# Patient Record
Sex: MALE | Race: WHITE | HISPANIC OR LATINO | Employment: UNEMPLOYED | URBAN - METROPOLITAN AREA
[De-identification: names, ages, dates, MRNs, and addresses within clinical notes are randomized per-mention and may not be internally consistent; named-entity substitution may affect disease eponyms.]

---

## 2023-08-10 ENCOUNTER — HOSPITAL ENCOUNTER (INPATIENT)
Facility: MEDICAL CENTER | Age: 38
LOS: 1 days | DRG: 917 | End: 2023-08-11
Attending: STUDENT IN AN ORGANIZED HEALTH CARE EDUCATION/TRAINING PROGRAM | Admitting: HOSPITALIST

## 2023-08-10 ENCOUNTER — APPOINTMENT (OUTPATIENT)
Dept: RADIOLOGY | Facility: MEDICAL CENTER | Age: 38
DRG: 917 | End: 2023-08-10
Attending: EMERGENCY MEDICINE

## 2023-08-10 ENCOUNTER — APPOINTMENT (OUTPATIENT)
Dept: RADIOLOGY | Facility: MEDICAL CENTER | Age: 38
DRG: 917 | End: 2023-08-10
Attending: STUDENT IN AN ORGANIZED HEALTH CARE EDUCATION/TRAINING PROGRAM

## 2023-08-10 DIAGNOSIS — S09.90XA CLOSED HEAD INJURY, INITIAL ENCOUNTER: ICD-10-CM

## 2023-08-10 DIAGNOSIS — R09.02 HYPOXIA: ICD-10-CM

## 2023-08-10 DIAGNOSIS — S06.0XAA CONCUSSION WITH UNKNOWN LOSS OF CONSCIOUSNESS STATUS, INITIAL ENCOUNTER: ICD-10-CM

## 2023-08-10 DIAGNOSIS — T40.601A OPIATE OR RELATED NARCOTIC OVERDOSE, ACCIDENTAL OR UNINTENTIONAL, INITIAL ENCOUNTER (HCC): ICD-10-CM

## 2023-08-10 PROBLEM — E86.0 DEHYDRATION: Status: ACTIVE | Noted: 2023-08-10

## 2023-08-10 PROBLEM — R73.9 HYPERGLYCEMIA: Status: ACTIVE | Noted: 2023-08-10

## 2023-08-10 PROBLEM — R74.8 ELEVATED LIVER ENZYMES: Status: ACTIVE | Noted: 2023-08-10

## 2023-08-10 PROBLEM — J96.01 ACUTE HYPOXEMIC RESPIRATORY FAILURE (HCC): Status: ACTIVE | Noted: 2023-08-10

## 2023-08-10 PROBLEM — T50.901A ACCIDENTAL OVERDOSE: Status: ACTIVE | Noted: 2023-08-10

## 2023-08-10 PROBLEM — G93.40 ENCEPHALOPATHY ACUTE: Status: ACTIVE | Noted: 2023-08-10

## 2023-08-10 LAB
ABO + RH BLD: NORMAL
ABO GROUP BLD: NORMAL
ALBUMIN SERPL BCP-MCNC: 3.9 G/DL (ref 3.2–4.9)
ALBUMIN/GLOB SERPL: 1.3 G/DL
ALP SERPL-CCNC: 111 U/L (ref 30–99)
ALT SERPL-CCNC: 256 U/L (ref 2–50)
AMPHET UR QL SCN: POSITIVE
ANION GAP SERPL CALC-SCNC: 13 MMOL/L (ref 7–16)
APTT PPP: 26.4 SEC (ref 24.7–36)
AST SERPL-CCNC: 216 U/L (ref 12–45)
BARBITURATES UR QL SCN: NEGATIVE
BENZODIAZ UR QL SCN: NEGATIVE
BILIRUB SERPL-MCNC: 0.2 MG/DL (ref 0.1–1.5)
BLD GP AB SCN SERPL QL: NORMAL
BUN SERPL-MCNC: 10 MG/DL (ref 8–22)
BZE UR QL SCN: POSITIVE
CALCIUM ALBUM COR SERPL-MCNC: 8.5 MG/DL (ref 8.5–10.5)
CALCIUM SERPL-MCNC: 8.4 MG/DL (ref 8.5–10.5)
CANNABINOIDS UR QL SCN: POSITIVE
CHLORIDE SERPL-SCNC: 104 MMOL/L (ref 96–112)
CO2 SERPL-SCNC: 22 MMOL/L (ref 20–33)
CREAT SERPL-MCNC: 1 MG/DL (ref 0.5–1.4)
ERYTHROCYTE [DISTWIDTH] IN BLOOD BY AUTOMATED COUNT: 45.8 FL (ref 35.9–50)
EST. AVERAGE GLUCOSE BLD GHB EST-MCNC: 114 MG/DL
ETHANOL BLD-MCNC: <10.1 MG/DL
FENTANYL UR QL: POSITIVE
GFR SERPLBLD CREATININE-BSD FMLA CKD-EPI: 100 ML/MIN/1.73 M 2
GLOBULIN SER CALC-MCNC: 3 G/DL (ref 1.9–3.5)
GLUCOSE BLD STRIP.AUTO-MCNC: 101 MG/DL (ref 65–99)
GLUCOSE BLD STRIP.AUTO-MCNC: 107 MG/DL (ref 65–99)
GLUCOSE BLD STRIP.AUTO-MCNC: 87 MG/DL (ref 65–99)
GLUCOSE SERPL-MCNC: 254 MG/DL (ref 65–99)
HAV IGM SERPL QL IA: NORMAL
HBA1C MFR BLD: 5.6 % (ref 4–5.6)
HBV CORE IGM SER QL: NORMAL
HBV SURFACE AG SER QL: NORMAL
HCT VFR BLD AUTO: 47 % (ref 42–52)
HCV AB SER QL: NORMAL
HGB BLD-MCNC: 15.1 G/DL (ref 14–18)
INR PPP: 1.01 (ref 0.87–1.13)
MCH RBC QN AUTO: 29.8 PG (ref 27–33)
MCHC RBC AUTO-ENTMCNC: 32.1 G/DL (ref 32.3–36.5)
MCV RBC AUTO: 92.7 FL (ref 81.4–97.8)
METHADONE UR QL SCN: NEGATIVE
OPIATES UR QL SCN: NEGATIVE
OXYCODONE UR QL SCN: NEGATIVE
PCP UR QL SCN: NEGATIVE
PLATELET # BLD AUTO: 258 K/UL (ref 164–446)
PMV BLD AUTO: 10.6 FL (ref 9–12.9)
POTASSIUM SERPL-SCNC: 3.9 MMOL/L (ref 3.6–5.5)
PROPOXYPH UR QL SCN: NEGATIVE
PROT SERPL-MCNC: 6.9 G/DL (ref 6–8.2)
PROTHROMBIN TIME: 13.2 SEC (ref 12–14.6)
RBC # BLD AUTO: 5.07 M/UL (ref 4.7–6.1)
RH BLD: NORMAL
SODIUM SERPL-SCNC: 139 MMOL/L (ref 135–145)
WBC # BLD AUTO: 8.9 K/UL (ref 4.8–10.8)

## 2023-08-10 PROCEDURE — 83036 HEMOGLOBIN GLYCOSYLATED A1C: CPT

## 2023-08-10 PROCEDURE — 86850 RBC ANTIBODY SCREEN: CPT

## 2023-08-10 PROCEDURE — 82962 GLUCOSE BLOOD TEST: CPT | Mod: 91

## 2023-08-10 PROCEDURE — 99223 1ST HOSP IP/OBS HIGH 75: CPT | Performed by: HOSPITALIST

## 2023-08-10 PROCEDURE — 99285 EMERGENCY DEPT VISIT HI MDM: CPT

## 2023-08-10 PROCEDURE — 86900 BLOOD TYPING SEROLOGIC ABO: CPT

## 2023-08-10 PROCEDURE — 770020 HCHG ROOM/CARE - TELE (206)

## 2023-08-10 PROCEDURE — 80307 DRUG TEST PRSMV CHEM ANLYZR: CPT

## 2023-08-10 PROCEDURE — 700111 HCHG RX REV CODE 636 W/ 250 OVERRIDE (IP): Mod: JZ

## 2023-08-10 PROCEDURE — 700111 HCHG RX REV CODE 636 W/ 250 OVERRIDE (IP): Performed by: EMERGENCY MEDICINE

## 2023-08-10 PROCEDURE — 82077 ASSAY SPEC XCP UR&BREATH IA: CPT

## 2023-08-10 PROCEDURE — 85610 PROTHROMBIN TIME: CPT

## 2023-08-10 PROCEDURE — 85027 COMPLETE CBC AUTOMATED: CPT

## 2023-08-10 PROCEDURE — 305948 HCHG GREEN TRAUMA ACT PRE-NOTIFY NO CC

## 2023-08-10 PROCEDURE — 70450 CT HEAD/BRAIN W/O DYE: CPT

## 2023-08-10 PROCEDURE — 71111 X-RAY EXAM RIBS/CHEST4/> VWS: CPT

## 2023-08-10 PROCEDURE — 36415 COLL VENOUS BLD VENIPUNCTURE: CPT

## 2023-08-10 PROCEDURE — 85730 THROMBOPLASTIN TIME PARTIAL: CPT

## 2023-08-10 PROCEDURE — 96374 THER/PROPH/DIAG INJ IV PUSH: CPT

## 2023-08-10 PROCEDURE — 700105 HCHG RX REV CODE 258: Mod: JZ | Performed by: HOSPITALIST

## 2023-08-10 PROCEDURE — 86901 BLOOD TYPING SEROLOGIC RH(D): CPT

## 2023-08-10 PROCEDURE — 80074 ACUTE HEPATITIS PANEL: CPT

## 2023-08-10 PROCEDURE — 80053 COMPREHEN METABOLIC PANEL: CPT

## 2023-08-10 RX ORDER — AMOXICILLIN 250 MG
2 CAPSULE ORAL 2 TIMES DAILY
Status: DISCONTINUED | OUTPATIENT
Start: 2023-08-10 | End: 2023-08-11 | Stop reason: HOSPADM

## 2023-08-10 RX ORDER — NALOXONE HYDROCHLORIDE 0.4 MG/ML
0.4 INJECTION, SOLUTION INTRAMUSCULAR; INTRAVENOUS; SUBCUTANEOUS ONCE
Status: COMPLETED | OUTPATIENT
Start: 2023-08-10 | End: 2023-08-10

## 2023-08-10 RX ORDER — PROMETHAZINE HYDROCHLORIDE 25 MG/1
12.5-25 TABLET ORAL EVERY 4 HOURS PRN
Status: DISCONTINUED | OUTPATIENT
Start: 2023-08-10 | End: 2023-08-11 | Stop reason: HOSPADM

## 2023-08-10 RX ORDER — NALOXONE HYDROCHLORIDE 1 MG/ML
INJECTION INTRAMUSCULAR; INTRAVENOUS; SUBCUTANEOUS
Status: COMPLETED
Start: 2023-08-10 | End: 2023-08-10

## 2023-08-10 RX ORDER — PROMETHAZINE HYDROCHLORIDE 25 MG/1
12.5-25 SUPPOSITORY RECTAL EVERY 4 HOURS PRN
Status: DISCONTINUED | OUTPATIENT
Start: 2023-08-10 | End: 2023-08-11 | Stop reason: HOSPADM

## 2023-08-10 RX ORDER — SODIUM CHLORIDE, SODIUM LACTATE, POTASSIUM CHLORIDE, CALCIUM CHLORIDE 600; 310; 30; 20 MG/100ML; MG/100ML; MG/100ML; MG/100ML
INJECTION, SOLUTION INTRAVENOUS CONTINUOUS
Status: DISCONTINUED | OUTPATIENT
Start: 2023-08-10 | End: 2023-08-11 | Stop reason: HOSPADM

## 2023-08-10 RX ORDER — BISACODYL 10 MG
10 SUPPOSITORY, RECTAL RECTAL
Status: DISCONTINUED | OUTPATIENT
Start: 2023-08-10 | End: 2023-08-11 | Stop reason: HOSPADM

## 2023-08-10 RX ORDER — ACETAMINOPHEN 325 MG/1
650 TABLET ORAL EVERY 6 HOURS PRN
Status: DISCONTINUED | OUTPATIENT
Start: 2023-08-10 | End: 2023-08-11 | Stop reason: HOSPADM

## 2023-08-10 RX ORDER — NALOXONE HYDROCHLORIDE 0.4 MG/ML
0.4 INJECTION, SOLUTION INTRAMUSCULAR; INTRAVENOUS; SUBCUTANEOUS
Status: DISCONTINUED | OUTPATIENT
Start: 2023-08-10 | End: 2023-08-11 | Stop reason: HOSPADM

## 2023-08-10 RX ORDER — PROCHLORPERAZINE EDISYLATE 5 MG/ML
5-10 INJECTION INTRAMUSCULAR; INTRAVENOUS EVERY 4 HOURS PRN
Status: DISCONTINUED | OUTPATIENT
Start: 2023-08-10 | End: 2023-08-11 | Stop reason: HOSPADM

## 2023-08-10 RX ORDER — POLYETHYLENE GLYCOL 3350 17 G/17G
1 POWDER, FOR SOLUTION ORAL
Status: DISCONTINUED | OUTPATIENT
Start: 2023-08-10 | End: 2023-08-11 | Stop reason: HOSPADM

## 2023-08-10 RX ORDER — ONDANSETRON 2 MG/ML
4 INJECTION INTRAMUSCULAR; INTRAVENOUS EVERY 4 HOURS PRN
Status: DISCONTINUED | OUTPATIENT
Start: 2023-08-10 | End: 2023-08-11 | Stop reason: HOSPADM

## 2023-08-10 RX ORDER — ONDANSETRON 4 MG/1
4 TABLET, ORALLY DISINTEGRATING ORAL EVERY 4 HOURS PRN
Status: DISCONTINUED | OUTPATIENT
Start: 2023-08-10 | End: 2023-08-11 | Stop reason: HOSPADM

## 2023-08-10 RX ORDER — DEXTROSE MONOHYDRATE 25 G/50ML
25 INJECTION, SOLUTION INTRAVENOUS
Status: DISCONTINUED | OUTPATIENT
Start: 2023-08-10 | End: 2023-08-11 | Stop reason: HOSPADM

## 2023-08-10 RX ADMIN — SODIUM CHLORIDE, POTASSIUM CHLORIDE, SODIUM LACTATE AND CALCIUM CHLORIDE: 600; 310; 30; 20 INJECTION, SOLUTION INTRAVENOUS at 22:34

## 2023-08-10 RX ADMIN — NALOXONE HYDROCHLORIDE: 1 INJECTION PARENTERAL at 03:45

## 2023-08-10 RX ADMIN — SODIUM CHLORIDE, POTASSIUM CHLORIDE, SODIUM LACTATE AND CALCIUM CHLORIDE: 600; 310; 30; 20 INJECTION, SOLUTION INTRAVENOUS at 09:44

## 2023-08-10 RX ADMIN — NALOXONE HYDROCHLORIDE 0.4 MG: 0.4 INJECTION, SOLUTION INTRAMUSCULAR; INTRAVENOUS; SUBCUTANEOUS at 07:55

## 2023-08-10 ASSESSMENT — PATIENT HEALTH QUESTIONNAIRE - PHQ9
1. LITTLE INTEREST OR PLEASURE IN DOING THINGS: NOT AT ALL
SUM OF ALL RESPONSES TO PHQ9 QUESTIONS 1 AND 2: 0
2. FEELING DOWN, DEPRESSED, IRRITABLE, OR HOPELESS: NOT AT ALL

## 2023-08-10 ASSESSMENT — LIFESTYLE VARIABLES
TOTAL SCORE: 0
EVER FELT BAD OR GUILTY ABOUT YOUR DRINKING: NO
TOTAL SCORE: 0
HAVE PEOPLE ANNOYED YOU BY CRITICIZING YOUR DRINKING: NO
EVER HAD A DRINK FIRST THING IN THE MORNING TO STEADY YOUR NERVES TO GET RID OF A HANGOVER: NO
HAVE YOU EVER FELT YOU SHOULD CUT DOWN ON YOUR DRINKING: NO
ON A TYPICAL DAY WHEN YOU DRINK ALCOHOL HOW MANY DRINKS DO YOU HAVE: 1
CONSUMPTION TOTAL: NEGATIVE
DOES PATIENT WANT TO STOP DRINKING: NO
HOW MANY TIMES IN THE PAST YEAR HAVE YOU HAD 5 OR MORE DRINKS IN A DAY: 0
TOTAL SCORE: 0
ALCOHOL_USE: YES
AVERAGE NUMBER OF DAYS PER WEEK YOU HAVE A DRINK CONTAINING ALCOHOL: 2

## 2023-08-10 ASSESSMENT — FIBROSIS 4 INDEX: FIB4 SCORE: 1.83

## 2023-08-10 ASSESSMENT — PAIN DESCRIPTION - PAIN TYPE
TYPE: ACUTE PAIN
TYPE: ACUTE PAIN

## 2023-08-10 NOTE — ASSESSMENT & PLAN NOTE
Unclear if this is acute or chronic   No apparent abdominal pain or discomfort/tenderness on examination  I will check acute hepatitis panel    Continue to monitor, avoid/minimize hepatotoxins as much as possible.

## 2023-08-10 NOTE — PROGRESS NOTES
4 Eyes Skin Assessment Completed by NICKI Winter and NICKI Tucker.    Head Swelling and Redness, scab  Ears WDL  Nose WDL  Mouth WDL  Neck WDL  Breast/Chest WDL  Shoulder Blades WDL  Spine WDL  (R) Arm/Elbow/Hand Redness and Scab  (L) Arm/Elbow/Hand WDL  Abdomen WDL  Groin WDL  Scrotum/Coccyx/Buttocks WDL  (R) Leg WDL  (L) Leg WDL  (R) Heel/Foot/Toe WDL  (L) Heel/Foot/Toe WDL          Devices In Places Tele Box, Blood Pressure Cuff, Pulse Ox, and Oxy Mask      Interventions In Place Heels Loaded W/Pillows and Pressure Redistribution Mattress    Possible Skin Injury Yes    Pictures Uploaded Into Epic N/A  Wound Consult Placed N/A  RN Wound Prevention Protocol Ordered No

## 2023-08-10 NOTE — ED NOTES
Pt resting, respirations even and unlabored. Supplemental O2 decreased to 4L, SPO2 above 90%. Call light within reach

## 2023-08-10 NOTE — ED NOTES
Assumed care of pt, bedside report received. Introduced self as pt RN, bed alarm placed, GCS 15, NAD, VSS on 1L oxy mask, aware of POC, denies needs at this time, call light in reach, will continue to monitor.

## 2023-08-10 NOTE — CARE PLAN
The patient is Watcher - Medium risk of patient condition declining or worsening    Shift Goals  Clinical Goals: stable VS  Patient Goals: rest  Family Goals: EVERARDO      Problem: Knowledge Deficit - Standard  Goal: Patient and family/care givers will demonstrate understanding of plan of care, disease process/condition, diagnostic tests and medications  Outcome: Progressing     Problem: Pain - Standard  Goal: Alleviation of pain or a reduction in pain to the patient’s comfort goal  Outcome: Progressing     Problem: Fall Risk  Goal: Patient will remain free from falls  Outcome: Progressing     Problem: Skin Integrity  Goal: Skin integrity is maintained or improved  Outcome: Progressing

## 2023-08-10 NOTE — ED NOTES
Rounded on pt, GCS 15, NAD, VSS on RA, respirations even and unlabored, denies needs at this time. Will continue to monitor.

## 2023-08-10 NOTE — ED NOTES
BIB EMS found unresponsive in a parking lot with civilian CPR in progress. EMS administered 0.5mg Narcan and inserted a NPA, GCS 8. Pt arrives GCS 10, room air saturation 66%. Pt c/o head pain.

## 2023-08-10 NOTE — ED NOTES
Pt o2 sats 75% on RA. Placed on 1L oxy mask, sat up to 85%, bumped up to 4L. Respirations 8, even and shallow. ERP notified. Medicated per MAR.

## 2023-08-10 NOTE — H&P
Hospital Medicine History & Physical Note    Date of Service  8/10/2023    Primary Care Physician  No primary care provider on file.     Consultants  None    Code Status  Full Code    Chief Complaint  Chief Complaint   Patient presents with    Trauma Green     BIB EMS found unresponsive in a parking lot with civilian CPR in progress. EMS administered 0.5mg Narcan and inserted a NPA, GCS 8. Pt arrives GCS 10, room air saturation 66%. Pt c/o head pain.      History of Presenting Illness  Dylan Marinelli Jr. is a 37 y.o. male with a past medical history of drug use who presented 8/10/2023 with altered mental status.  Most of the history was obtained from emergency department physician and patient chart as the patient was encephalopathic during my bedside evaluation.  Patient was found to be hypoxemic saturating 66% on room air.  He received 3 doses of Narcan with some improvement in his mental and respiratory status.      I discussed the plan of care with emergency department physician    Review of Systems  Review of Systems   Unable to perform ROS: Mental status change     Past Medical History   has no past medical history on file.    Surgical History   has no past surgical history on file.     Family History  family history is not on file.      Social History   reports that he has been smoking cigarettes. He has been smoking an average of .5 packs per day. He has never used smokeless tobacco. He reports current alcohol use. He reports current drug use.    Allergies  No Known Allergies    Medications  None     Physical Exam  Temp:  [37.1 °C (98.8 °F)] 37.1 °C (98.8 °F)  Pulse:  [] 96  Resp:  [12-31] 14  BP: (121-170)/() 125/58  SpO2:  [66 %-100 %] 93 %  Blood Pressure: 130/83   Temperature: 37.1 °C (98.8 °F)   Pulse: 87   Respiration: 12   Pulse Oximetry: 97 %     Physical Exam  Constitutional:       General: He is in acute distress.      Appearance: He is ill-appearing. He is not diaphoretic.       Comments: Lethargic secondary, arousable with difficulty   HENT:      Head: Atraumatic.      Right Ear: External ear normal.      Left Ear: External ear normal.      Nose: No congestion or rhinorrhea.      Mouth/Throat:      Mouth: Mucous membranes are dry.   Eyes:      General: No scleral icterus.        Right eye: No discharge.         Left eye: No discharge.      Pupils: Pupils are equal, round, and reactive to light.   Cardiovascular:      Rate and Rhythm: Regular rhythm. Tachycardia present.   Pulmonary:      Comments: Irregular respiratory rate  Requiring 4-6 L of oxygen to achieve adequate saturation  Abdominal:      General: There is no distension.   Musculoskeletal:      Cervical back: Neck supple. No rigidity. No muscular tenderness.      Right lower leg: No edema.      Left lower leg: No edema.   Skin:     General: Skin is dry.      Capillary Refill: Capillary refill takes 2 to 3 seconds.      Coloration: Skin is not jaundiced or pale.   Neurological:      Mental Status: He is disoriented.      Coordination: Coordination normal.      Comments: Lethargic secondary, arousable with difficulty  Intermittently and variably oriented x 1-2   Psychiatric:      Comments: Lethargic secondary, arousable with difficulty       Laboratory:  Recent Labs     08/10/23  0340   WBC 8.9   RBC 5.07   HEMOGLOBIN 15.1   HEMATOCRIT 47.0   MCV 92.7   MCH 29.8   MCHC 32.1*   RDW 45.8   PLATELETCT 258   MPV 10.6     Recent Labs     08/10/23  0340   SODIUM 139   POTASSIUM 3.9   CHLORIDE 104   CO2 22   GLUCOSE 254*   BUN 10   CREATININE 1.00   CALCIUM 8.4*     Recent Labs     08/10/23  0340   ALTSGPT 256*   ASTSGOT 216*   ALKPHOSPHAT 111*   TBILIRUBIN 0.2   GLUCOSE 254*     Recent Labs     08/10/23  0340   APTT 26.4   INR 1.01     No results for input(s): NTPROBNP in the last 72 hours.      No results for input(s): TROPONINT in the last 72 hours.    Imaging:  [id number] (WITH 1-VIEW CXR)   Final Result      No fracture or  acute process seen.      CT-HEAD W/O   Final Result      No acute process.           Assessment/Plan:  Justification for Admission Status  I anticipate this patient will require at least two midnights for appropriate medical management, necessitating inpatient admission because the patient has multiple acute medical problems including acute hypoxemic respiratory failure, acute encephalopathy will require close monitoring, oxygen therapy, breathing treatment, oxygen titration    Patient will need a Telemetry bed on MEDICAL service.  Patient is suspected to have an overdose.    * Encephalopathy acute- (present on admission)  Assessment & Plan  CT scan of the head did not show evidence for acute infarction or hemorrhage  Likely metabolic likely secondary to hypoxia and drug overdose  Anticipate improvement with hypoxia and drug clearance  Consider further diagnostic testing /imaging if encephalopathy does not improve with above measures.     Acute hypoxemic respiratory failure (HCC)- (present on admission)  Assessment & Plan  Likely secondary to drug overdose  Received 3 doses of Narcan so far   I will continue to order as needed Narcan as needed  Oxygen as needed, Respiratory protocol, Bronchodilators, Incentive spirometry  Anticipate improvement with hypoxia and drug clearance     Accidental overdose- (present on admission)  Assessment & Plan  Mr. Marinelli was here with a suspected overdose of Unknown substance or drug; he has no other known overdoses.     Likely narcotics as there is some response to Narcan  Continue to monitor respiratory status, I will order Narcan as needed  Continue to monitor vital signs, support circulation with intravenous fluids    Dehydration- (present on admission)  Assessment & Plan  Likely secondary to reduced oral intake.  Encourage oral intake as tolerated, antiemetics as needed.  Intravenous hydration until adequate oral intake is achieved    Elevated liver enzymes- (present on  admission)  Assessment & Plan  Unclear if this is acute or chronic   No apparent abdominal pain or discomfort/tenderness on examination  I will check acute hepatitis panel    Continue to monitor, avoid/minimize hepatotoxins as much as possible.     Hyperglycemia- (present on admission)  Assessment & Plan  No known history of diabetes  Presents with hyperglycemia  I will check a glycated hemoglobin   I will start short acting insulin for now  I will order Accu-Checks, hypoglycemia protocol  Adjust according to blood sugars trend    VTE prophylaxis: SCDs/TEDs and Xarelto 10 mg daily as prophylaxis

## 2023-08-10 NOTE — ASSESSMENT & PLAN NOTE
Likely secondary to drug overdose  Received 3 doses of Narcan so far   I will continue to order as needed Narcan as needed  Oxygen as needed, Respiratory protocol, Bronchodilators, Incentive spirometry  Anticipate improvement with hypoxia and drug clearance

## 2023-08-10 NOTE — ASSESSMENT & PLAN NOTE
No known history of diabetes  Presents with hyperglycemia  I will check a glycated hemoglobin   I will start short acting insulin for now  I will order Accu-Checks, hypoglycemia protocol  Adjust according to blood sugars trend

## 2023-08-10 NOTE — ED NOTES
Pt trailed off supplemental O2, SPO2 drop to 89%. Pt placed back on supplemental O2, 1L. SPO2 back to 92%.

## 2023-08-10 NOTE — ED TRIAGE NOTES
Chief Complaint   Patient presents with    Trauma Green     BIB EMS found unresponsive in a parking lot with civilian CPR in progress. EMS administered 0.5mg Narcan and inserted a NPA, GCS 8. Pt arrives GCS 10, room air saturation 66%. Pt c/o head pain.       Patient upgraded to Trauma Green from red 6, pt to CT in stable condition.

## 2023-08-10 NOTE — DISCHARGE INSTRUCTIONS
Diet    Diabetic Diet     A Diabetic Diet can help you control your blood glucose, lower your risk of heart disease, improve your blood pressure and maintain a healthy weight.  Eating healthy foods, low in calories, fat and carbohydrates at the same time every day can help control your blood glucose. Carbohydrates can greatly affect your blood glucose levels.  Counting carbs is important to keep your blood glucose at a healthy level, especially if you use insulin or take certain oral diabetes medicines.  Avoid alcohol as it can cause a sudden decrease in blood glucose (hypoglycemia), especially if you use insulin or take certain oral diabetes medicines.    Activity    Resume Your Normal Activity    You may resume your normal activity as tolerated.  Rest as needed.

## 2023-08-10 NOTE — ED PROVIDER NOTES
ED Observation Discharge Summary    Scribed for Caitlin Vaughn M.D. by Sergio Haynes. 8/10/2023  11:17 AM    Patient:Dylan Marinelli Jr.  Patient : 1985  Patient MRN: [id number]  Patient PCP: No primary care provider reported.     Admit Date: 8/10/2023  Discharge Date and Time: 08/10/23 11:17 AM  Discharge Diagnosis:   1. Closed head injury, initial encounter    2. Opiate or related narcotic overdose, accidental or unintentional, initial encounter ([facility])    3. Concussion with unknown loss of consciousness status, initial encounter    4. Hypoxia      Discharge Attending: Caitlin Vaughn M.D.  Discharge Service: ED Observation    ED Course  Dylan is a 37 y.o. male who was evaluated at [facility]. Mr. Marinelli initially presented to this emergency department early this morning with altered mental status, and hypoxia.  Noted that he received IV Narcan with minimal response.  After he was given 2 mg of naloxone hypoxia resolved, he became much more awake and alert, hypoxemia resolved.  He was noted to be hypoxic to 66% on room air.  Noted that he did get CPR in the prehospital setting, has not required cardiopulmonary resuscitation in the emergency department.    On my reassessment, resting pulse oximetry is in the mid 90s, however with movement or speaking he drops to the mid 80s.  He still feels somewhat drowsy, but is awake and alert without any evidence of airway deterioration or mental status deterioration.  We will continue to keep him under close observation given hypoxia with activity.    Shortly after my initial assessment I was notified by RN that his oxygen saturation dropped to 70%, and respiratory rate dropped to 8.  Oxygen saturation improved with increased supplemental oxygen.  He was given another dose of 0.4 mg Narcan with improvement, and now no longer is on supplemental oxygen.    At this time he has been in the emergency department for 5 hours.  He required a dose of Narcan prior to arrival  in the prehospital setting, as well as a large dose of Narcan on arrival at this facility.  Despite multiple doses of Narcan, he continues to have episodes of hypoxia which would clearly progressed to apneic events without rapid intervention.  For this reason plan at this time is for admission to hospitalist service for ongoing monitoring, pulse oximetry monitoring, and repeat doses of Narcan as needed.  He states that he thought he was using cocaine, and did not use any opiate medications.  At this time, suspect the cocaine may have been laced with an opiate such as long-acting fentanyl.    Constitutional: Afebrile  HENT:  Atraumatic, normocephalic  Eyes: Normal conjunctiva, no periorbital edema  Neck: Normal and painless range of motion, supple  Cardiovascular: No tachycardia  Thorax & Lungs: Breath sounds clear and equal bilaterally, when awake and alert oxygen saturation is in the mid to high 90s, though he has had waxing and waning hypoxia as documented above  Abdomen: Nondistended  Skin:  Warm, dry, intact  Extremities: No abnormal swelling nor deformity  Psychiatric: Calm and cooperative    Labs  All labs reviewed.  Urine drug screen positive for Cocaine and Fentanyl    Radiology  [id number] (WITH 1-VIEW CXR)   Final Result      No fracture or acute process seen.      CT-HEAD W/O   Final Result      No acute process.            Medications:   There are no discharge medications for this patient.    My final assessment includes   1. Closed head injury, initial encounter    2. Opiate or related narcotic overdose, accidental or unintentional, initial encounter ([facility])    3. Concussion with unknown loss of consciousness status, initial encounter    4. Hypoxia        Upon Reevaluation, the patient's condition has: not improved; and will be escalated to hospitalization.    Patient discharged from ED Observation status at 8:44 AM, 8/10/2023.     Total time spent on this ED Observation discharge encounter is 45  minutes.     ISergio (Scribe), am scribing for, and in the presence of, Caitlin Vaughn M.D.    Electronically signed by: Sergio Haynes (Scribe), 8/10/2023    Caitlin IRAHETA M.D. personally performed the services described in this documentation, as scribed by Sergio Haynes in my presence, and it is both accurate and complete.     The note accurately reflects work and decisions made by me.  Caitlin Vaughn M.D.  8/13/2023  10:00 AM

## 2023-08-10 NOTE — ED PROVIDER NOTES
"ED Provider Note    CHIEF COMPLAINT  Chief Complaint   Patient presents with    Trauma Green     BIB EMS found unresponsive in a parking lot with civilian CPR in progress. EMS administered 0.5mg Narcan and inserted a NPA, GCS 8. Pt arrives GCS 10, room air saturation 66%. Pt c/o head pain.        HPI/ROS  LIMITATION TO HISTORY   Select: : None  OUTSIDE HISTORIAN(S):  EMS ports the patient received CPR in route and Narcan    Dylan Marinelli Jr. is a 37 y.o. male who presents with trauma to the face and back of the head, altered mental status, hypoxic, 0.5 mg of IV Narcan with minimal response.  Patient does not require pain.  Patient reports he took a pill that he thought was a pain pill.  Patient denies any known trauma or falling out of a moving vehicle.  Patient reports that he has his tetanus up-to-date.    PAST MEDICAL HISTORY       SURGICAL HISTORY  patient denies any surgical history    FAMILY HISTORY  History reviewed. No pertinent family history.    SOCIAL HISTORY  Social History     Tobacco Use    Smoking status: Every Day     Packs/day: 0.50     Types: Cigarettes    Smokeless tobacco: Never   Substance and Sexual Activity    Alcohol use: Yes     Comment: couple times a week    Drug use: Yes     Comment: \"pills\", marijuana    Sexual activity: Not on file       CURRENT MEDICATIONS  Home Medications       Reviewed by Lilia Thayer RTORIBIO (Registered Nurse) on 08/10/23 at 0351  Med List Status: Partial     Medication Last Dose Status        Patient Miguel Taking any Medications                           ALLERGIES  No Known Allergies    PHYSICAL EXAM  VITAL SIGNS: BP (!) 121/94   Pulse 98   Temp 37.1 °C (98.8 °F) (Temporal)   Resp (!) 31   Ht 1.803 m (5' 11\")   Wt 90.7 kg (200 lb)   SpO2 100%   BMI 27.89 kg/m²      Primary Survey:  Airway is intact, breath sounds equal bilaterally, pulses 2+ upper extremity and lower extremity, GCS - 13  Patient fully exposed and gowned in trauma " bay.    Secondary Survey:  Constitutional: Drowsy, open eyes to verbal stimulation, confused  HENT: Abrasions to the mid upper forehead, ecchymosis to the occiput  Eyes: Pupils equal and reactive, normal conjunctiva  Neck: Supple, normal range of motion, no stridor  Cardiovascular: Extremities are warm and well perfused, no murmur appreciated, normal cardiac auscultation  Pulmonary: No respiratory distress, normal work of breathing, no accessory muscule usage, breath sounds clear and equal bilaterally  Abdomen: Soft, non-distended, non-tender to palpation, no peritoneal signs  Skin: Warm, dry, no rashes or lesions  Musculoskeletal: Normal range of motion in all extremities, no swelling or deformity noted, no C/T/L spine midline TTP, step-offs or deformities  Neurologic: Drowsy GCS 13 normal motor function    DIAGNOSTIC STUDIES / PROCEDURES    LABS  No anemia    RADIOLOGY  I have independently interpreted the diagnostic imaging associated with this visit and am waiting the final reading from the radiologist.   My preliminary interpretation is as follows: No intracranial hemorrhage  Radiologist interpretation: No intracranial hemorrhage    COURSE & MEDICAL DECISION MAKING    ED Observation Status? Yes; I am placing the patient in to an observation status due to a diagnostic uncertainty as well as therapeutic intensity. Patient placed in observation status at 4:16 AM, 8/10/2023.     Observation plan is as follows: Pending 6-hour observation post Narcan      INITIAL ASSESSMENT, COURSE AND PLAN  Care Narrative: Patient responded very well to 2 mg of IV naloxone.  Patient hypoxia resolved, patient became alert after significant drowsiness and hypoxemia 66%.  CT head demonstrates no evidence of intracranial abnormality or skull fracture.  No other identified however patient did get CPR, x-ray of the chest to rule out rib or sternal fracture.  Disposition pending observation following Narcan administration.  No spine  injury.  Care of patient handed off to oncoming provider pending observation prior to discharge.    This dictation has been created using voice recognition software. I am continuously working with the software to minimize the number of voice recognition errors and I have made every attempt to manually correct the errors within my dictation. However errors  related to this voice recognition software may still exist and should be interpreted within the appropriate context.     Electronically signed by: Vince Verde M.D., 8/10/2023 4:16 AM    CRITICAL CARE  The very real possibilty of a deterioration of this patient's condition required the highest level of my preparedness for sudden, emergent intervention.  I provided critical care services, which included medication orders, frequent reevaluations of the patient's condition and response to treatment, ordering and reviewing test results, and discussing the case with various consultants.  The critical care time associated with the care of the patient was 35 minutes. Review chart for interventions. This time is exclusive of any other billable procedures.       FINAL DIAGNOSIS  1. Closed head injury, initial encounter    2. Opiate or related narcotic overdose, accidental or unintentional, initial encounter (HCC)           Electronically signed by: Vince Verde M.D., 8/10/2023 4:13 AM

## 2023-08-10 NOTE — ASSESSMENT & PLAN NOTE
Mr. Marinelli was here with a suspected overdose of Unknown substance or drug; he has no other known overdoses.     Likely narcotics as there is some response to Narcan  Continue to monitor respiratory status, I will order Narcan as needed  Continue to monitor vital signs, support circulation with intravenous fluids

## 2023-08-11 VITALS
WEIGHT: 190.04 LBS | SYSTOLIC BLOOD PRESSURE: 117 MMHG | OXYGEN SATURATION: 98 % | DIASTOLIC BLOOD PRESSURE: 79 MMHG | TEMPERATURE: 98.2 F | HEART RATE: 64 BPM | HEIGHT: 71 IN | RESPIRATION RATE: 16 BRPM | BODY MASS INDEX: 26.6 KG/M2

## 2023-08-11 PROBLEM — R73.9 HYPERGLYCEMIA: Status: RESOLVED | Noted: 2023-08-10 | Resolved: 2023-08-11

## 2023-08-11 PROBLEM — R74.8 ELEVATED LIVER ENZYMES: Status: RESOLVED | Noted: 2023-08-10 | Resolved: 2023-08-11

## 2023-08-11 PROBLEM — E86.0 DEHYDRATION: Status: RESOLVED | Noted: 2023-08-10 | Resolved: 2023-08-11

## 2023-08-11 PROBLEM — G93.40 ENCEPHALOPATHY ACUTE: Status: RESOLVED | Noted: 2023-08-10 | Resolved: 2023-08-11

## 2023-08-11 PROBLEM — T50.901A ACCIDENTAL OVERDOSE: Status: RESOLVED | Noted: 2023-08-10 | Resolved: 2023-08-11

## 2023-08-11 LAB
ALBUMIN SERPL BCP-MCNC: 3.8 G/DL (ref 3.2–4.9)
ALBUMIN/GLOB SERPL: 1.6 G/DL
ALP SERPL-CCNC: 83 U/L (ref 30–99)
ALT SERPL-CCNC: 152 U/L (ref 2–50)
ANION GAP SERPL CALC-SCNC: 8 MMOL/L (ref 7–16)
AST SERPL-CCNC: 75 U/L (ref 12–45)
BILIRUB SERPL-MCNC: 0.3 MG/DL (ref 0.1–1.5)
BUN SERPL-MCNC: 8 MG/DL (ref 8–22)
CALCIUM ALBUM COR SERPL-MCNC: 9 MG/DL (ref 8.5–10.5)
CALCIUM SERPL-MCNC: 8.8 MG/DL (ref 8.5–10.5)
CHLORIDE SERPL-SCNC: 100 MMOL/L (ref 96–112)
CO2 SERPL-SCNC: 27 MMOL/L (ref 20–33)
CREAT SERPL-MCNC: 0.77 MG/DL (ref 0.5–1.4)
ERYTHROCYTE [DISTWIDTH] IN BLOOD BY AUTOMATED COUNT: 46.2 FL (ref 35.9–50)
GFR SERPLBLD CREATININE-BSD FMLA CKD-EPI: 119 ML/MIN/1.73 M 2
GLOBULIN SER CALC-MCNC: 2.4 G/DL (ref 1.9–3.5)
GLUCOSE BLD STRIP.AUTO-MCNC: 101 MG/DL (ref 65–99)
GLUCOSE SERPL-MCNC: 111 MG/DL (ref 65–99)
HCT VFR BLD AUTO: 45.2 % (ref 42–52)
HGB BLD-MCNC: 14.3 G/DL (ref 14–18)
MAGNESIUM SERPL-MCNC: 2.1 MG/DL (ref 1.5–2.5)
MCH RBC QN AUTO: 29.3 PG (ref 27–33)
MCHC RBC AUTO-ENTMCNC: 31.6 G/DL (ref 32.3–36.5)
MCV RBC AUTO: 92.6 FL (ref 81.4–97.8)
PLATELET # BLD AUTO: 209 K/UL (ref 164–446)
PMV BLD AUTO: 10.8 FL (ref 9–12.9)
POTASSIUM SERPL-SCNC: 4.1 MMOL/L (ref 3.6–5.5)
PROT SERPL-MCNC: 6.2 G/DL (ref 6–8.2)
RBC # BLD AUTO: 4.88 M/UL (ref 4.7–6.1)
SODIUM SERPL-SCNC: 135 MMOL/L (ref 135–145)
WBC # BLD AUTO: 9.5 K/UL (ref 4.8–10.8)

## 2023-08-11 PROCEDURE — 82962 GLUCOSE BLOOD TEST: CPT

## 2023-08-11 PROCEDURE — 80053 COMPREHEN METABOLIC PANEL: CPT

## 2023-08-11 PROCEDURE — 83735 ASSAY OF MAGNESIUM: CPT

## 2023-08-11 PROCEDURE — 36415 COLL VENOUS BLD VENIPUNCTURE: CPT

## 2023-08-11 PROCEDURE — 99239 HOSP IP/OBS DSCHRG MGMT >30: CPT | Performed by: INTERNAL MEDICINE

## 2023-08-11 PROCEDURE — 85027 COMPLETE CBC AUTOMATED: CPT

## 2023-08-11 RX ORDER — NALOXONE HYDROCHLORIDE 4 MG/.1ML
1 SPRAY NASAL
Qty: 2 EACH | Refills: 0 | Status: SHIPPED | OUTPATIENT
Start: 2023-08-11

## 2023-08-11 ASSESSMENT — COGNITIVE AND FUNCTIONAL STATUS - GENERAL
MOBILITY SCORE: 24
SUGGESTED CMS G CODE MODIFIER MOBILITY: CH
STANDING UP FROM CHAIR USING ARMS: A LITTLE
DAILY ACTIVITIY SCORE: 23
WALKING IN HOSPITAL ROOM: A LITTLE
TOILETING: A LITTLE
DAILY ACTIVITIY SCORE: 24
CLIMB 3 TO 5 STEPS WITH RAILING: A LITTLE
SUGGESTED CMS G CODE MODIFIER DAILY ACTIVITY: CI
SUGGESTED CMS G CODE MODIFIER DAILY ACTIVITY: CH

## 2023-08-11 NOTE — PROGRESS NOTES
Assumed care of patient at bedside report from NOC RN.  Patient currently A & O x 4; on 2 L O2 via face mask; up standby assist; without complaints of acute pain. Call light within reach. Whiteboard updated. Fall precautions in place. Bed locked and in lowest position. All questions answered. No other needs indicated at this time.

## 2023-08-11 NOTE — PROGRESS NOTES
Bedside report received. Patient A/Ox 4. VS -140's . Oxygen requirements of 4L. Telemetry monitoring SR-ST. No complaints of pain currently. POC discussed with patient. Pt verbalizes understanding. Call light and belongings within reach. Bed locked and lowest position, alarm and fall precautions in place.

## 2023-08-11 NOTE — CARE PLAN
The patient is Watcher - Medium risk of patient condition declining or worsening    Shift Goals  Clinical Goals: Monitor RR, wean O2  Patient Goals: Sleep  Family Goals:  (Not present)    Progress made toward(s) clinical / shift goals:    Problem: Pain - Standard  Goal: Alleviation of pain or a reduction in pain to the patient’s comfort goal  Outcome: Progressing  Note: Using numeric scale. Administering medication as ordered. Reassessing pain after administering. Offering non-pharmacologic methods to relieve pain.     Problem: Fall Risk  Goal: Patient will remain free from falls  Outcome: Progressing  Note: Call light and personal belongings placed within reach. Bed in lowest position. Treaded socks on patient. Bed alarm on. Patient advised to call for assistance. Patient verbalizes understanding.       Patient is not progressing towards the following goals:

## 2023-08-11 NOTE — PROGRESS NOTES
Naloxone 4 mg/0.1 mL nasal spray (2 pack) was dispensed to the patient and friend was also in the room for prevention of potential opioid related overdose per protocol.     Counseling was provided regarding:  - Information relating to the recognition, prevention and responses to opioid-related drug overdoses  - How to safely administer the naloxone nasal spray  - Potential side effects and adverse events related to the naloxone nasal spray  - The importance of seeking immediate emergency medical assistance for a person experiencing an opioid-related drug overdose, even after the administration of naloxone  - The immunity from certain civil or criminal liabilities for seeking medical assistance for a person experiencing an opioid-related overdose    Naloxone was given to the patient's friend.    Agnes Bradshaw, RubenD

## 2023-08-12 NOTE — DISCHARGE SUMMARY
Discharge Summary    CHIEF COMPLAINT ON ADMISSION  Chief Complaint   Patient presents with    Trauma Green     BIB EMS found unresponsive in a parking lot with civilian CPR in progress. EMS administered 0.5mg Narcan and inserted a NPA, GCS 8. Pt arrives GCS 10, room air saturation 66%. Pt c/o head pain.        Reason for Admission  Encephalopathy acute     Admission Date  8/10/2023    CODE STATUS  Prior    HPI & HOSPITAL COURSE    Dylan Marinelli Jr. is a 37 y.o. male with a past medical history of drug use who presented 8/10/2023 with altered mental status.  On presentation most of the history was obtained from emergency department physician and patient chart as the patient was encephalopathic during my bedside evaluation.  On admission patient was found to be hypoxemic saturating 66% on room air.  He received 3 doses of Narcan with some improvement in his mental and respiratory status.    Today I evaluated and examined him at the bedside.  He is currently at his baseline and denies complaints of nausea, vomiting, diarrhea and constipation.  His altered mental status has resolved.  He is maintaining ox saturation on room air.  I provided him extensive counseling regarding complete cessation of street drugs and he expressed understanding.  I ordered Narcan and explained him that in case of any drug overdose he should use Narcan.  Patient's friend at the bedside and I obtained patient's consent and after permission I discussed plan of care with her as well.  He found to have elevated liver enzymes and it has been trending down.  He will need outpatient follow-up.  His acute hepatitis panel came back negative.    Today on the day of discharge he denies any acute complaints and he feels needed to be discharged.  I discussed plan of care with bedside RN.    Therefore, he is discharged in good and stable condition to home with close outpatient follow-up.    The patient recovered much more quickly than anticipated on  admission.    Discharge Date  8/11/2023    FOLLOW UP ITEMS POST DISCHARGE  Primary care provider  DISCHARGE DIAGNOSES  Principal Problem (Resolved):    Encephalopathy acute (POA: Yes)  Active Problems:    Acute hypoxemic respiratory failure (HCC) (POA: Yes)  Resolved Problems:    Accidental overdose (POA: Yes)    Hyperglycemia (POA: Yes)    Elevated liver enzymes (POA: Yes)    Dehydration (POA: Yes)      FOLLOW UP    [facility], Emergency Dept  [address] [facility] [id number] [phone]  Go to   As needed, If symptoms worsen    [facility]  [address] [facility] [address]  [phone]  Go in 2 days  For follow up and evaluation      MEDICATIONS ON DISCHARGE     Medication List        START taking these medications        Instructions   Naloxone 4 MG/0.1ML Liqd  Commonly known as: Narcan   Administer 4 mg into affected nostril(S) one time as needed (For narcotic overdose.) for up to 1 dose.  Dose: 1 Spray              Allergies  No Known Allergies    DIET  No orders of the defined types were placed in this encounter.      ACTIVITY  As tolerated.  Weight bearing as tolerated    CONSULTATIONS  None    PROCEDURES  None    LABORATORY  Lab Results   Component Value Date    SODIUM 135 08/11/2023    POTASSIUM 4.1 08/11/2023    CHLORIDE 100 08/11/2023    CO2 27 08/11/2023    GLUCOSE 111 (H) 08/11/2023    BUN 8 08/11/2023    CREATININE 0.77 08/11/2023        Lab Results   Component Value Date    WBC 9.5 08/11/2023    HEMOGLOBIN 14.3 08/11/2023    HEMATOCRIT 45.2 08/11/2023    PLATELETCT 209 08/11/2023      [id number] (WITH 1-VIEW CXR)   Final Result      No fracture or acute process seen.      CT-HEAD W/O   Final Result      No acute process.               Total time of the discharge process exceeds 32 minutes.

## 2023-12-22 ENCOUNTER — APPOINTMENT (OUTPATIENT)
Dept: RADIOLOGY | Facility: MEDICAL CENTER | Age: 38
End: 2023-12-22
Attending: EMERGENCY MEDICINE
Payer: COMMERCIAL

## 2023-12-22 ENCOUNTER — HOSPITAL ENCOUNTER (OUTPATIENT)
Facility: MEDICAL CENTER | Age: 38
End: 2023-12-23
Attending: EMERGENCY MEDICINE | Admitting: STUDENT IN AN ORGANIZED HEALTH CARE EDUCATION/TRAINING PROGRAM
Payer: COMMERCIAL

## 2023-12-22 DIAGNOSIS — E86.0 DEHYDRATION: ICD-10-CM

## 2023-12-22 DIAGNOSIS — R11.2 NAUSEA AND VOMITING, UNSPECIFIED VOMITING TYPE: ICD-10-CM

## 2023-12-22 DIAGNOSIS — K56.7 ILEUS (HCC): ICD-10-CM

## 2023-12-22 DIAGNOSIS — R10.9 ABDOMINAL PAIN, UNSPECIFIED ABDOMINAL LOCATION: ICD-10-CM

## 2023-12-22 PROBLEM — K52.9 GASTROENTERITIS: Status: ACTIVE | Noted: 2023-12-22

## 2023-12-22 LAB
ALBUMIN SERPL BCP-MCNC: 4.4 G/DL (ref 3.2–4.9)
ALBUMIN/GLOB SERPL: 1.3 G/DL
ALP SERPL-CCNC: 105 U/L (ref 30–99)
ALT SERPL-CCNC: 26 U/L (ref 2–50)
ANION GAP SERPL CALC-SCNC: 11 MMOL/L (ref 7–16)
AST SERPL-CCNC: 27 U/L (ref 12–45)
BASOPHILS # BLD AUTO: 0.2 % (ref 0–1.8)
BASOPHILS # BLD: 0.04 K/UL (ref 0–0.12)
BILIRUB SERPL-MCNC: 0.5 MG/DL (ref 0.1–1.5)
BUN SERPL-MCNC: 12 MG/DL (ref 8–22)
CALCIUM ALBUM COR SERPL-MCNC: 8.5 MG/DL (ref 8.5–10.5)
CALCIUM SERPL-MCNC: 8.8 MG/DL (ref 8.5–10.5)
CHLORIDE SERPL-SCNC: 102 MMOL/L (ref 96–112)
CO2 SERPL-SCNC: 25 MMOL/L (ref 20–33)
CREAT SERPL-MCNC: 1.04 MG/DL (ref 0.5–1.4)
EKG IMPRESSION: NORMAL
EOSINOPHIL # BLD AUTO: 0.1 K/UL (ref 0–0.51)
EOSINOPHIL NFR BLD: 0.5 % (ref 0–6.9)
ERYTHROCYTE [DISTWIDTH] IN BLOOD BY AUTOMATED COUNT: 43.8 FL (ref 35.9–50)
GFR SERPLBLD CREATININE-BSD FMLA CKD-EPI: 95 ML/MIN/1.73 M 2
GLOBULIN SER CALC-MCNC: 3.3 G/DL (ref 1.9–3.5)
GLUCOSE SERPL-MCNC: 125 MG/DL (ref 65–99)
HCT VFR BLD AUTO: 56.4 % (ref 42–52)
HGB BLD-MCNC: 18.7 G/DL (ref 14–18)
IMM GRANULOCYTES # BLD AUTO: 0.08 K/UL (ref 0–0.11)
IMM GRANULOCYTES NFR BLD AUTO: 0.4 % (ref 0–0.9)
LACTATE SERPL-SCNC: 1.5 MMOL/L (ref 0.5–2)
LIPASE SERPL-CCNC: 29 U/L (ref 11–82)
LYMPHOCYTES # BLD AUTO: 0.71 K/UL (ref 1–4.8)
LYMPHOCYTES NFR BLD: 3.5 % (ref 22–41)
MCH RBC QN AUTO: 29.4 PG (ref 27–33)
MCHC RBC AUTO-ENTMCNC: 33.2 G/DL (ref 32.3–36.5)
MCV RBC AUTO: 88.8 FL (ref 81.4–97.8)
MONOCYTES # BLD AUTO: 0.59 K/UL (ref 0–0.85)
MONOCYTES NFR BLD AUTO: 2.9 % (ref 0–13.4)
NEUTROPHILS # BLD AUTO: 18.93 K/UL (ref 1.82–7.42)
NEUTROPHILS NFR BLD: 92.5 % (ref 44–72)
NRBC # BLD AUTO: 0 K/UL
NRBC BLD-RTO: 0 /100 WBC (ref 0–0.2)
PLATELET # BLD AUTO: 285 K/UL (ref 164–446)
PMV BLD AUTO: 10.2 FL (ref 9–12.9)
POTASSIUM SERPL-SCNC: 4.3 MMOL/L (ref 3.6–5.5)
PROT SERPL-MCNC: 7.7 G/DL (ref 6–8.2)
RBC # BLD AUTO: 6.35 M/UL (ref 4.7–6.1)
SODIUM SERPL-SCNC: 138 MMOL/L (ref 135–145)
TROPONIN T SERPL-MCNC: <6 NG/L (ref 6–19)
WBC # BLD AUTO: 20.5 K/UL (ref 4.8–10.8)

## 2023-12-22 PROCEDURE — 99285 EMERGENCY DEPT VISIT HI MDM: CPT

## 2023-12-22 PROCEDURE — 96374 THER/PROPH/DIAG INJ IV PUSH: CPT

## 2023-12-22 PROCEDURE — 84484 ASSAY OF TROPONIN QUANT: CPT

## 2023-12-22 PROCEDURE — 93005 ELECTROCARDIOGRAM TRACING: CPT | Performed by: EMERGENCY MEDICINE

## 2023-12-22 PROCEDURE — 83690 ASSAY OF LIPASE: CPT

## 2023-12-22 PROCEDURE — 700117 HCHG RX CONTRAST REV CODE 255: Performed by: EMERGENCY MEDICINE

## 2023-12-22 PROCEDURE — 93005 ELECTROCARDIOGRAM TRACING: CPT

## 2023-12-22 PROCEDURE — 83605 ASSAY OF LACTIC ACID: CPT

## 2023-12-22 PROCEDURE — 700111 HCHG RX REV CODE 636 W/ 250 OVERRIDE (IP): Mod: JZ | Performed by: STUDENT IN AN ORGANIZED HEALTH CARE EDUCATION/TRAINING PROGRAM

## 2023-12-22 PROCEDURE — G0378 HOSPITAL OBSERVATION PER HR: HCPCS

## 2023-12-22 PROCEDURE — 700105 HCHG RX REV CODE 258: Performed by: EMERGENCY MEDICINE

## 2023-12-22 PROCEDURE — 96376 TX/PRO/DX INJ SAME DRUG ADON: CPT

## 2023-12-22 PROCEDURE — 71045 X-RAY EXAM CHEST 1 VIEW: CPT

## 2023-12-22 PROCEDURE — 80053 COMPREHEN METABOLIC PANEL: CPT

## 2023-12-22 PROCEDURE — 700111 HCHG RX REV CODE 636 W/ 250 OVERRIDE (IP): Mod: JZ | Performed by: EMERGENCY MEDICINE

## 2023-12-22 PROCEDURE — 85025 COMPLETE CBC W/AUTO DIFF WBC: CPT

## 2023-12-22 PROCEDURE — 96375 TX/PRO/DX INJ NEW DRUG ADDON: CPT

## 2023-12-22 PROCEDURE — 87040 BLOOD CULTURE FOR BACTERIA: CPT

## 2023-12-22 PROCEDURE — 36415 COLL VENOUS BLD VENIPUNCTURE: CPT

## 2023-12-22 PROCEDURE — 74177 CT ABD & PELVIS W/CONTRAST: CPT

## 2023-12-22 PROCEDURE — 76705 ECHO EXAM OF ABDOMEN: CPT

## 2023-12-22 RX ORDER — METOCLOPRAMIDE HYDROCHLORIDE 5 MG/ML
10 INJECTION INTRAMUSCULAR; INTRAVENOUS ONCE
Status: COMPLETED | OUTPATIENT
Start: 2023-12-22 | End: 2023-12-22

## 2023-12-22 RX ORDER — SODIUM CHLORIDE 9 MG/ML
30 INJECTION, SOLUTION INTRAVENOUS ONCE
Status: COMPLETED | OUTPATIENT
Start: 2023-12-22 | End: 2023-12-22

## 2023-12-22 RX ORDER — ONDANSETRON 2 MG/ML
4 INJECTION INTRAMUSCULAR; INTRAVENOUS ONCE
Status: COMPLETED | OUTPATIENT
Start: 2023-12-22 | End: 2023-12-22

## 2023-12-22 RX ORDER — MORPHINE SULFATE 4 MG/ML
4 INJECTION INTRAVENOUS ONCE
Status: COMPLETED | OUTPATIENT
Start: 2023-12-22 | End: 2023-12-22

## 2023-12-22 RX ADMIN — ONDANSETRON 4 MG: 2 INJECTION INTRAMUSCULAR; INTRAVENOUS at 18:00

## 2023-12-22 RX ADMIN — METOCLOPRAMIDE 10 MG: 5 INJECTION, SOLUTION INTRAMUSCULAR; INTRAVENOUS at 22:44

## 2023-12-22 RX ADMIN — SODIUM CHLORIDE 2448 ML: 9 INJECTION, SOLUTION INTRAVENOUS at 18:23

## 2023-12-22 RX ADMIN — IOHEXOL 95 ML: 350 INJECTION, SOLUTION INTRAVENOUS at 20:30

## 2023-12-22 RX ADMIN — MORPHINE SULFATE 4 MG: 4 INJECTION, SOLUTION INTRAMUSCULAR; INTRAVENOUS at 19:37

## 2023-12-22 RX ADMIN — MORPHINE SULFATE 4 MG: 4 INJECTION, SOLUTION INTRAMUSCULAR; INTRAVENOUS at 18:00

## 2023-12-22 ASSESSMENT — PAIN DESCRIPTION - PAIN TYPE: TYPE: ACUTE PAIN

## 2023-12-22 ASSESSMENT — FIBROSIS 4 INDEX: FIB4 SCORE: 1.05

## 2023-12-23 VITALS
SYSTOLIC BLOOD PRESSURE: 117 MMHG | OXYGEN SATURATION: 95 % | DIASTOLIC BLOOD PRESSURE: 74 MMHG | BODY MASS INDEX: 27.65 KG/M2 | WEIGHT: 197.53 LBS | TEMPERATURE: 97.9 F | HEART RATE: 86 BPM | HEIGHT: 71 IN | RESPIRATION RATE: 16 BRPM

## 2023-12-23 PROCEDURE — G0378 HOSPITAL OBSERVATION PER HR: HCPCS

## 2023-12-23 PROCEDURE — 96376 TX/PRO/DX INJ SAME DRUG ADON: CPT

## 2023-12-23 PROCEDURE — 700111 HCHG RX REV CODE 636 W/ 250 OVERRIDE (IP): Mod: JZ | Performed by: STUDENT IN AN ORGANIZED HEALTH CARE EDUCATION/TRAINING PROGRAM

## 2023-12-23 RX ORDER — MORPHINE SULFATE 4 MG/ML
1 INJECTION INTRAVENOUS ONCE
Status: COMPLETED | OUTPATIENT
Start: 2023-12-23 | End: 2023-12-23

## 2023-12-23 RX ORDER — ONDANSETRON 2 MG/ML
4 INJECTION INTRAMUSCULAR; INTRAVENOUS EVERY 4 HOURS PRN
Status: DISCONTINUED | OUTPATIENT
Start: 2023-12-23 | End: 2023-12-23 | Stop reason: HOSPADM

## 2023-12-23 RX ORDER — PROMETHAZINE HYDROCHLORIDE 25 MG/1
12.5-25 TABLET ORAL EVERY 4 HOURS PRN
Status: DISCONTINUED | OUTPATIENT
Start: 2023-12-23 | End: 2023-12-23 | Stop reason: HOSPADM

## 2023-12-23 RX ORDER — PROMETHAZINE HYDROCHLORIDE 25 MG/1
12.5-25 SUPPOSITORY RECTAL EVERY 4 HOURS PRN
Status: DISCONTINUED | OUTPATIENT
Start: 2023-12-23 | End: 2023-12-23 | Stop reason: HOSPADM

## 2023-12-23 RX ORDER — PROCHLORPERAZINE EDISYLATE 5 MG/ML
5-10 INJECTION INTRAMUSCULAR; INTRAVENOUS EVERY 4 HOURS PRN
Status: DISCONTINUED | OUTPATIENT
Start: 2023-12-23 | End: 2023-12-23 | Stop reason: HOSPADM

## 2023-12-23 RX ORDER — SODIUM CHLORIDE, SODIUM LACTATE, POTASSIUM CHLORIDE, CALCIUM CHLORIDE 600; 310; 30; 20 MG/100ML; MG/100ML; MG/100ML; MG/100ML
INJECTION, SOLUTION INTRAVENOUS CONTINUOUS
Status: DISCONTINUED | OUTPATIENT
Start: 2023-12-23 | End: 2023-12-23 | Stop reason: HOSPADM

## 2023-12-23 RX ORDER — ONDANSETRON 4 MG/1
4 TABLET, ORALLY DISINTEGRATING ORAL EVERY 4 HOURS PRN
Status: DISCONTINUED | OUTPATIENT
Start: 2023-12-23 | End: 2023-12-23 | Stop reason: HOSPADM

## 2023-12-23 RX ADMIN — MORPHINE SULFATE 1 MG: 4 INJECTION, SOLUTION INTRAMUSCULAR; INTRAVENOUS at 00:41

## 2023-12-23 ASSESSMENT — FIBROSIS 4 INDEX: FIB4 SCORE: 0.67

## 2023-12-23 ASSESSMENT — PAIN DESCRIPTION - PAIN TYPE: TYPE: ACUTE PAIN

## 2023-12-23 NOTE — H&P
"Hospital Medicine History & Physical Note    Date of Service  12/22/2023    Primary Care Physician  Pcp Pt States None    Consultants  {Other providers:24007}    Specialist Names: ***    Code Status  Prior    Chief Complaint  Chief Complaint   Patient presents with    Abdominal Pain     C/o RUQ abd pain \"it feels like bruising.\"  Denies injury.  States the pain radiates down through right leg. X 3 hours    N/V     X 3 hours.          History of Presenting Illness  Dylan Marinelli Jr. is a 38 y.o. male who presented 12/22/2023 with ***.    I discussed the plan of care with {Discussed with...:92734}.    Review of Systems  ROS    Past Medical History   has no past medical history on file.    Surgical History   has no past surgical history on file.     Family History  family history is not on file.   Family history reviewed with patient. There {is/is no:09106} family history that is pertinent to the chief complaint.     Social History   reports that he has been smoking cigarettes. He has never used smokeless tobacco. He reports current alcohol use. He reports that he does not currently use drugs.    Allergies  No Known Allergies    Medications  Prior to Admission Medications   Prescriptions Last Dose Informant Patient Reported? Taking?   Naloxone (NARCAN) 4 MG/0.1ML Liquid   No No   Sig: Administer 4 mg into affected nostril(S) one time as needed (For narcotic overdose.) for up to 1 dose.      Facility-Administered Medications: None       Physical Exam  Temp:  [36.6 °C (97.8 °F)] 36.6 °C (97.8 °F)  Pulse:  [] 91  Resp:  [15-20] 20  BP: ()/(60-79) 135/79  SpO2:  [94 %-100 %] 100 %  Blood Pressure: 135/79   Temperature: 36.6 °C (97.8 °F)   Pulse: 91   Respiration: 20   Pulse Oximetry: 100 %       Physical Exam    Laboratory:  Recent Labs     12/22/23  1652   WBC 20.5*   RBC 6.35*   HEMOGLOBIN 18.7*   HEMATOCRIT 56.4*   MCV 88.8   MCH 29.4   MCHC 33.2   RDW 43.8   PLATELETCT 285   MPV 10.2     Recent Labs " "    12/22/23  1652   SODIUM 138   POTASSIUM 4.3   CHLORIDE 102   CO2 25   GLUCOSE 125*   BUN 12   CREATININE 1.04   CALCIUM 8.8     Recent Labs     12/22/23  1652   ALTSGPT 26   ASTSGOT 27   ALKPHOSPHAT 105*   TBILIRUBIN 0.5   LIPASE 29   GLUCOSE 125*         No results for input(s): \"NTPROBNP\" in the last 72 hours.      Recent Labs     12/22/23  1652   TROPONINT <6       Imaging:  CT-ABDOMEN-PELVIS WITH   Final Result      1.  Fluid-filled loops of small and large bowel as well as stomach. There is no zone of transition or evidence of obstruction. This may represent presence of gastroenteritis.      2.  Fatty liver with a small cyst or hemangioma.      3.  Linear atelectasis within the left lung base.      US-RUQ   Final Result      Negative right upper quadrant ultrasound.      DX-CHEST-PORTABLE (1 VIEW)   Final Result      No acute cardiopulmonary abnormality.          {xray/ekg interpretation:71368}    Assessment/Plan:  Justification for Admission Status  I anticipate this patient {Anticipated Status:02185}    Patient will need a Med/Surg bed on MEDICAL service .  The need is secondary to ***.    No new Assessment & Plan notes have been filed under this hospital service since the last note was generated.  Service: Hospital Medicine      VTE prophylaxis: {VTE selection:04707}  "

## 2023-12-23 NOTE — PROGRESS NOTES
Patient brought up to room T203 from ED via [facility]. Patient ambulated from gurney to bed. Patient A&Ox4. While completing admission profile patient asked if his girlfriend could stay the night with him. I informed him that the hospital's policy is that visiting hours are from 8am-8pm. Patient stated that he understood the policy but that he did not want to stay in the hospital unless his girlfriend could stay. Charge RN and admitting hospitalist notified. Admitting Hospitalist said patient is A&Ox4 and can sign out AMA if he would like. Patient requesting pain medication before leaving. Hospitalist ordered 1mg IV morphine. Patient's girlfriend verbalized that she would drive him home. Patient given pain medication. IV catheter removed, tip intact, bleeding controlled. Patient educated about the risks of leaving against medical advice. Patient instructed to return to the ED if his symptoms get any worse or do not get better. Patient was asked if he still wanted to leave AMA and he said yes. Patient ambulated out of room T203 with all belongings with his girlfriend.

## 2023-12-23 NOTE — ED TRIAGE NOTES
"Chief Complaint   Patient presents with    Abdominal Pain     C/o RUQ abd pain \"it feels like bruising.\"  Denies injury.  States the pain radiates down through right leg. X 3 hours    N/V     X 3 hours.        To triage via w/c for above.   Protocols ordered.     "

## 2023-12-23 NOTE — ED NOTES
Attempted to obtain urine sample, pt states he is unable to provide one at this time, will try again later.

## 2023-12-23 NOTE — ED PROVIDER NOTES
"ED Provider Note    Scribed for Alem Bcuio M.D. by Hitesh Dela Cruz. 12/22/2023, 5:42 PM.    Primary care provider: No primary care provider noted.  Means of arrival: vehicle  History obtained from: Patient  History limited by: None pertinent.    CHIEF COMPLAINT  Chief Complaint   Patient presents with    Abdominal Pain     C/o RUQ abd pain \"it feels like bruising.\"  Denies injury.  States the pain radiates down through right leg. X 3 hours    N/V     X 3 hours.          HPI/ROS  Dylan Marinelli Jr. is a 38 y.o. male who presents to the Emergency Department for evaluation of abdominal pain onset today. He has associated nausea and emesis. He reports the pain is in his right abdomen, radiating to his ribs and legs.  He reports regular bowel movements. He has no prior history of similar pain.  He denies any history of abdominal surgery.  Patient denies any substance abuse.    EXTERNAL RECORDS REVIEWED  Patient was hospitalized for opiate overdose in August of this year, he had pre-hospital cardiac arrest and required Narcan.    LIMITATION TO HISTORY   Select: : None    OUTSIDE HISTORIAN(S):  Significant other Partner is present.      PAST MEDICAL HISTORY   none    SURGICAL HISTORY  patient denies any surgical history    SOCIAL HISTORY  Social History     Tobacco Use    Smoking status: Every Day     Current packs/day: 0.50     Types: Cigarettes    Smokeless tobacco: Never   Vaping Use    Vaping Use: Never used   Substance Use Topics    Alcohol use: Yes     Comment: couple times a week    Drug use: Not Currently      Social History     Substance and Sexual Activity   Drug Use Not Currently       FAMILY HISTORY  History reviewed. No pertinent family history.    CURRENT MEDICATIONS  Home Medications       Reviewed by Yarely Wells R.N. (Registered Nurse) on 12/22/23 at 1632  Med List Status: Not Addressed     Medication Last Dose Status   Naloxone (NARCAN) 4 MG/0.1ML Liquid  Active              " "      ALLERGIES  No Known Allergies    PHYSICAL EXAM  VITAL SIGNS: /73   Pulse (!) 101   Temp 36.6 °C (97.8 °F) (Temporal)   Resp 18   Ht 1.803 m (5' 11\")   Wt 81.6 kg (180 lb)   SpO2 97%   BMI 25.10 kg/m²   Vitals reviewed by myself.  Physical Exam  Nursing note and vitals reviewed.  Constitutional: Well-developed and well-nourished. Patient appears uncomfortable, vomiting during assessment  HENT: Head is normocephalic and atraumatic.  Eyes: extra-ocular movements intact  Cardiovascular: regular rate and  regular rhythm. No murmur heard.  Pulmonary/Chest: Breath sounds normal. No wheezes or rales.   Abdominal: Right sided (upper and lower) abdominal and epigastric tenderness quadrant tenderness upon palpation. No distention.    Musculoskeletal: Extremities exhibit normal range of motion without edema or tenderness.   Neurological: Awake and alert  Skin: Skin is warm and dry. No rash.        DIAGNOSTIC STUDIES:  LABS  Labs Reviewed   CBC WITH DIFFERENTIAL - Abnormal; Notable for the following components:       Result Value    WBC 20.5 (*)     RBC 6.35 (*)     Hemoglobin 18.7 (*)     Hematocrit 56.4 (*)     Neutrophils-Polys 92.50 (*)     Lymphocytes 3.50 (*)     Neutrophils (Absolute) 18.93 (*)     Lymphs (Absolute) 0.71 (*)     All other components within normal limits    Narrative:     Biotin intake of greater than 5 mg per day may interfere with  troponin levels, causing false low values.   COMP METABOLIC PANEL - Abnormal; Notable for the following components:    Glucose 125 (*)     Alkaline Phosphatase 105 (*)     All other components within normal limits    Narrative:     Biotin intake of greater than 5 mg per day may interfere with  troponin levels, causing false low values.   LIPASE    Narrative:     Biotin intake of greater than 5 mg per day may interfere with  troponin levels, causing false low values.   TROPONIN    Narrative:     Biotin intake of greater than 5 mg per day may interfere " "with  troponin levels, causing false low values.   ESTIMATED GFR    Narrative:     Biotin intake of greater than 5 mg per day may interfere with  troponin levels, causing false low values.   URINALYSIS   BLOOD CULTURE    Narrative:     1 of 2 for Blood Culture x 2 sites order. Per Hospital  Policy: Only change Specimen Src: to \"Line\" if specified by  physician order.   BLOOD CULTURE    Narrative:     2 of 2 blood culture x2  Sites order. Per Hospital Policy:  Only change Specimen Src: to \"Line\" if specified by physician  order.   URINE DRUG SCREEN       All labs reviewed and independently interpreted by myself    EKG Interpretation:    12 Lead EKG independently interpreted by myself to show:  EKG at 4:40 PM: Normal sinus rhythm, heart rate 89, normal axis, normal intervals, , QRS 86, QTc 421, no acute ST-T segment changes, no evidence of acute arrhythmia or ischemia per my independent interpretation    RADIOLOGY  Images independently interpreted by myself prior to radiologist review:  -Ultrasound reviewed and demonstrates no evidence of biliary pathology, no cholelithiasis, no gallbladder wall thickening    Final interpretation by radiology demonstrates:    US-RUQ   Final Result      Negative right upper quadrant ultrasound.      DX-CHEST-PORTABLE (1 VIEW)   Final Result      No acute cardiopulmonary abnormality.      CT-ABDOMEN-PELVIS WITH    (Results Pending)     The radiologist's interpretation of all radiological studies have been reviewed by me.    COURSE & MEDICAL DECISION MAKING    ED Observation Status? Yes; I am placing the patient in to an observation status due to a diagnostic uncertainty as well as therapeutic intensity. Patient placed in observation status at 5:42 PM, 12/22/2023.     Observation plan is as follows: The patient will be reevaluated following diagnostics.      INITIAL ASSESSMENT AND PLAN    Patient is a 38-year-old male who presents for evaluation of abdominal pain, nausea and " vomiting.  Differential diagnosis includes viral gastroenteritis, pancreatitis, biliary pathology, colitis, appendicitis.  Diagnostic workup includes labs and right upper quadrant ultrasound.       REASSESSMENTS     HYDRATION: Based on the patient's presentation of Tachycardia the patient was given IV fluids. IV Hydration was used because oral hydration was not adequate alone. Upon recheck following hydration, the patient was improved.      ER COURSE AND FINAL DISPOSITION   Patient's initial vitals notable for tachycardia, he is vomiting during my assessment, appears uncomfortable with right-sided upper and lower abdominal tenderness.  He seems more uncomfortable with tenderness in the right upper quadrant and therefore assessing for biliary pathology initially.  Patient is empirically treated with morphine, Zofran and IV fluids.    Of note screening EKG was done in triage as his pain radiated into his ribs and EKG demonstrated no evidence of acute arrhythmia or ischemia.  Troponin was also done which was negative.  Patient has a heart score of 0 making him low risk for ACS.    Labs returned and are independently interpreted by myself demonstrate:  -LFTs are notable for mildly elevated alkaline phosphatase, otherwise unremarkable  -Electrolytes and renal function within normal limits  -Lipase is within normal limits ruling out pancreatitis  -Patient has a leukocytosis of 20 concerning for possible underlying bacterial pathology, imaging is pending.    Right upper quadrant ultrasound returns and demonstrates no evidence of biliary pathology.  Upon reassessment patient is still uncomfortable and is further treated with repeat dose of morphine.  At this time CT scan of the abdomen is ordered to assess for acute bacterial pathology.  He is signed out to oncoming physician to follow-up CT of the abdomen and disposition accordingly.  Patient signed out in guarded condition.    ADDITIONAL PROBLEM LIST AND RESOURCE  UTILIZATION    Additional problems aside from the chief complaint that I have addressed: none    I have discussed management of the patient with the following physicians and WON's: Dr. Guan    Discussion of management with other [facility] or appropriate source(s): none     Escalation of care considered, and ultimately not performed: see above.     Barriers to care at this time, including but not limited to: none.     Decision tools and prescription drugs considered including, but not limited to: see above.    Please see review of records as noted above      FINAL IMPRESSION  1. Nausea and vomiting, unspecified vomiting type    2. Abdominal pain, unspecified abdominal location          Hitesh IRAHETA (Scribe), am scribing for, and in the presence of, Alem Bucio M.D..    Electronically signed by: Hitesh Dela Cruz (Daphne), 12/22/2023    Alem IRAHETA M.D. personally performed the services described in this documentation, as scribed by Hitesh Dela Cruz in my presence, and it is both accurate and complete.    The note accurately reflects work and decisions made by me.  Alem Bucio M.D.  12/22/2023  7:47 PM

## 2023-12-23 NOTE — DISCHARGE SUMMARY
"  ED Observation Discharge Summary    Patient:Dylan Marinelli Jr.  Patient : 1985  Patient MRN: [id number]  Patient PCP: Pcp Pt States None    Admit Date: 2023  Discharge Date and Time: 23 11:54 PM  Discharge Diagnosis: Ileus, intractable nausea and vomiting  Discharge Attending: Anthony Guan M.D.  Discharge Service: ED Observation    ED Course  Dylan is a 38 y.o. male who was evaluated at [facility] for nausea and vomiting, acute onset right upper quadrant abdominal pain, was noted to have a leukocytosis of 20, and hemoconcentration, CT was nonspecific showed extremity distended stomach, and distended bowel loops without a transition point.  Patient has not passed stool, and was unable to tolerate p.o. despite multiple rounds of antiemetics, still actively vomiting, also noting right upper quadrant pain.  Notably, patient was initially tachycardic and significant leukocytosis of 20.5, however suspect this is reactive in the setting of gastroenteritis, or ileus.  Low suspicion for bacteremia/sepsis despite positive SIRS criteria attempted p.o. trials multiple times unsuccessfully, patient admitted for further care,.  Slow advancement of diet, IV fluids.    Discharge Exam:  /79   Pulse 91   Temp 36.6 °C (97.8 °F) (Temporal)   Resp 20   Ht 1.803 m (5' 11\")   Wt 81.6 kg (180 lb)   SpO2 100%   BMI 25.10 kg/m² .    Constitutional: Uncomfortable appearing holding emesis bag vomiting dark brown emesis  HENT:  Grossly normal  Eyes: Grossly normal  Neck: Normal range of motion  Cardiovascular: Normal heart rate   Thorax & Lungs: No respiratory distress  Abdomen: Distended, diffuse tenderness worse in the right upper quadrant  Skin:  No pathologic rash.   Extremities: Well perfused  Psychiatric: Affect normal    Labs  Results for orders placed or performed during the hospital encounter of 23   CBC WITH DIFFERENTIAL   Result Value Ref Range    WBC 20.5 (H) 4.8 - 10.8 K/uL    RBC 6.35 " (H) 4.70 - 6.10 M/uL    Hemoglobin 18.7 (H) 14.0 - 18.0 g/dL    Hematocrit 56.4 (H) 42.0 - 52.0 %    MCV 88.8 81.4 - 97.8 fL    MCH 29.4 27.0 - 33.0 pg    MCHC 33.2 32.3 - 36.5 g/dL    RDW 43.8 35.9 - 50.0 fL    Platelet Count 285 164 - 446 K/uL    MPV 10.2 9.0 - 12.9 fL    Neutrophils-Polys 92.50 (H) 44.00 - 72.00 %    Lymphocytes 3.50 (L) 22.00 - 41.00 %    Monocytes 2.90 0.00 - 13.40 %    Eosinophils 0.50 0.00 - 6.90 %    Basophils 0.20 0.00 - 1.80 %    Immature Granulocytes 0.40 0.00 - 0.90 %    Nucleated RBC 0.00 0.00 - 0.20 /100 WBC    Neutrophils (Absolute) 18.93 (H) 1.82 - 7.42 K/uL    Lymphs (Absolute) 0.71 (L) 1.00 - 4.80 K/uL    Monos (Absolute) 0.59 0.00 - 0.85 K/uL    Eos (Absolute) 0.10 0.00 - 0.51 K/uL    Baso (Absolute) 0.04 0.00 - 0.12 K/uL    Immature Granulocytes (abs) 0.08 0.00 - 0.11 K/uL    NRBC (Absolute) 0.00 K/uL   COMP METABOLIC PANEL   Result Value Ref Range    Sodium 138 135 - 145 mmol/L    Potassium 4.3 3.6 - 5.5 mmol/L    Chloride 102 96 - 112 mmol/L    Co2 25 20 - 33 mmol/L    Anion Gap 11.0 7.0 - 16.0    Glucose 125 (H) 65 - 99 mg/dL    Bun 12 8 - 22 mg/dL    Creatinine 1.04 0.50 - 1.40 mg/dL    Calcium 8.8 8.5 - 10.5 mg/dL    Correct Calcium 8.5 8.5 - 10.5 mg/dL    AST(SGOT) 27 12 - 45 U/L    ALT(SGPT) 26 2 - 50 U/L    Alkaline Phosphatase 105 (H) 30 - 99 U/L    Total Bilirubin 0.5 0.1 - 1.5 mg/dL    Albumin 4.4 3.2 - 4.9 g/dL    Total Protein 7.7 6.0 - 8.2 g/dL    Globulin 3.3 1.9 - 3.5 g/dL    A-G Ratio 1.3 g/dL   LIPASE   Result Value Ref Range    Lipase 29 11 - 82 U/L   Troponin   Result Value Ref Range    Troponin T <6 6 - 19 ng/L   ESTIMATED GFR   Result Value Ref Range    GFR (CKD-EPI) 95 >60 mL/min/1.73 m 2   LACTIC ACID   Result Value Ref Range    Lactic Acid 1.5 0.5 - 2.0 mmol/L   EKG   Result Value Ref Range    Report       [facility] Emergency Dept.    Test Date:  2023-12-22  Pt Name:    KATIUSKA JOSHUA               Department: ER  MRN:        [id number]                       Room:  Gender:     Male                         Technician: [id number]  :        1985                   Requested By:ER TRIAGE PROTOCOL  Order #:    [id number]                    Reading MD: Alem Bucio MD    Measurements  Intervals                                Axis  Rate:       89                           P:          83  MA:         144                          QRS:        93  QRSD:       86                           T:          71  QT:         346  QTc:        421    Interpretive Statements  Sinus rhythm  Borderline right axis deviation  No previous ECG available for comparison  Electronically Signed On 2023 19:42:20 PST by Alem Bucio MD         Radiology  CT-ABDOMEN-PELVIS WITH   Final Result      1.  Fluid-filled loops of small and large bowel as well as stomach. There is no zone of transition or evidence of obstruction. This may represent presence of gastroenteritis.      2.  Fatty liver with a small cyst or hemangioma.      3.  Linear atelectasis within the left lung base.      US-RUQ   Final Result      Negative right upper quadrant ultrasound.      DX-CHEST-PORTABLE (1 VIEW)   Final Result      No acute cardiopulmonary abnormality.          Medications:   New Prescriptions    No medications on file       My final assessment includes attempted p.o.  Upon Reevaluation, the patient's condition has: not improved; and will be escalated to hospitalization.    Patient discharged from ED Observation status at 2023 11:54 PM     Total time spent on this ED Observation discharge encounter is > 30 Minutes    Electronically signed by: Anthony Guan M.D., 2023 11:54 PM

## 2023-12-27 LAB
BACTERIA BLD CULT: NORMAL
BACTERIA BLD CULT: NORMAL
SIGNIFICANT IND 70042: NORMAL
SIGNIFICANT IND 70042: NORMAL
SITE SITE: NORMAL
SITE SITE: NORMAL
SOURCE SOURCE: NORMAL
SOURCE SOURCE: NORMAL